# Patient Record
Sex: FEMALE | Race: WHITE | NOT HISPANIC OR LATINO | Employment: FULL TIME | URBAN - METROPOLITAN AREA
[De-identification: names, ages, dates, MRNs, and addresses within clinical notes are randomized per-mention and may not be internally consistent; named-entity substitution may affect disease eponyms.]

---

## 2023-11-30 ENCOUNTER — TELEPHONE (OUTPATIENT)
Dept: NEUROLOGY | Facility: CLINIC | Age: 43
End: 2023-11-30

## 2023-11-30 NOTE — TELEPHONE ENCOUNTER
Laura Peralta called.  She is thinks she has Autism. Her brother has similar symptoms and they mentioned he may be on the spectrum. She would like  a referral to an adult autism specialist. Do you know any providers that treat this?

## 2024-01-04 ENCOUNTER — APPOINTMENT (OUTPATIENT)
Dept: PRIMARY CARE | Facility: CLINIC | Age: 44
End: 2024-01-04
Payer: COMMERCIAL

## 2024-01-05 ENCOUNTER — TELEMEDICINE (OUTPATIENT)
Dept: PRIMARY CARE | Facility: CLINIC | Age: 44
End: 2024-01-05
Payer: COMMERCIAL

## 2024-01-05 DIAGNOSIS — U07.1 COVID: Primary | ICD-10-CM

## 2024-01-05 PROCEDURE — 99212 OFFICE O/P EST SF 10 MIN: CPT | Performed by: FAMILY MEDICINE

## 2024-01-05 RX ORDER — ALBUTEROL SULFATE 90 UG/1
2 AEROSOL, METERED RESPIRATORY (INHALATION) EVERY 4 HOURS PRN
Qty: 6.7 G | Refills: 0 | Status: SHIPPED | OUTPATIENT
Start: 2024-01-05 | End: 2025-01-04

## 2024-01-05 NOTE — PROGRESS NOTES
Chief Complaint: URI sxs  Not on any long term meds  HPI:  +COVID test at home  Monday very tired Tuesday woke up and felt great and then out of nowhere head felt weird and tingly and occiput felt swollen and it hurt and thought optical migraine--took Mg/Zn/ibuprofen/flonase--COVID test + yesterday  Weird numbness/tingling in low face and glands with HA    Fever--sl Tuesday with HA-none since  Chills--sl Tuesday with HA--still present  Aches--Sl Tuesday with LOGAN  Cough--started last pm a little bit --today- no  Exhaustion--yes  Sob or wheeze--no  Chest tightness--no  Sore throat--dry on Monday--last night it felt sore and swollen-today when drinking water does not feel comfortable  Congestion--yes today last night--green when blows nose  RN/stuffy nose/PND--yes last night  Headache--yes-- still present  Nausea--Tuesday with the HA  Vomiting--no  Diarrhea--no  Appetite--ok  Fluids--yes  Exposures--brother had a stroke recently from HTN-- 275/142--so in and out of hospitals with him    OTC meds--see above     VAXXED ? No--gland swelling issues so PCP did not recommend  Prior hx of COVID infection? No    ALL OTHER ROS (-)    General appearance:  Vitals available from patient?Yes  Alert, oriented, pleasant, in no apparent distress Yes  Answers questions appropriatelyYes  Eyes clear?Yes  Is patient in respiratory distressNo    Throat exam Not Available  Is patient coughing during consult:No  Audible wheezing noted? :No  Pt sounds congested?: Yes  Sniffing or rhinorrhea?: Yes  Tender neck LAD by pt exam?:Yes  Psychiatric: Affect normalYes  Other relevant physical exam:    Pt location in [place] and consent obtained. Telemedicine appropriate evaluation completed. Appropriate physical exam done.    COVID +  Paxlovid discussed--how to take and how it works and adverse effects--paxlovid prescribed? Yes  CDC isolation guidelines discussed---first day of symptoms is day #0--- days 0-day #5 ISOLATE at home alone or with other COVID  + individuals-- Days #6-10 if you go out, wear a mask, either an N95 or a KN95 as you are likely still contagious.  Avoid immunocompromised, elderly, pregnant women, infants etc  Albuterol MDI as needed (has used in past with URIs)  Low threshold for in person evaluation

## 2024-01-05 NOTE — PATIENT INSTRUCTIONS
Please send me a [employer] message if you have any questions or concerns.  FOR NON URGENT questions only.  Allow up to 72 hours for response.    If you have prescription issues or other questions you can email   Osmel Brown,  Digital Health Coordinator, at   [email or web address]     COVID +  Paxlovid discussed--how to take and how it works and adverse effects--paxlovid prescribed? Yes  CDC isolation guidelines discussed---first day of symptoms is day #0--- days 0-day #5 ISOLATE at home alone or with other COVID + individuals-- Days #6-10 if you go out, wear a mask, either an N95 or a KN95 as you are likely still contagious.  Avoid immunocompromised, elderly, pregnant women, infants etc  Albuterol MDI as needed (has used in past with URIs)  Low threshold for in person evaluation     Rest and drink plenty of fluids    Tylenol and or motrin as needed for pain and fever (unless you have been told not to take these because of your personal medical history)    Discussed options and precautions:   Viral versus bacterial infection; use of medications; possible side effects; appropriate over-the-counter medications; possible complications and /or when to follow-up.    Follow-up in 1 to 2 days if not improving.  Follow-up immediately if symptoms worsen.    All red flags requiring in person care were discussed.  All patient's questions were answered.    Limitations to telemedicine include inability to do a complete and accurate physical exam.  Any concerns regarding this were conveyed with the patient and in person follow-up recommended if patient nature of illness does not progress as anticipated during this visit.    Over-the-counter cold and cough medications    Take Mucinex for cough, drink plenty of fluids with this medication and will help break up congestion making it easier to cough up    For cough can use honey (children ages 1 and up) in hot tea or hot water. I recommend putting this in an insulated  cup and carrying it around throughout the day to sip on.  Have it at your bedside at night in case you wake up coughing.  You can also use honey cough drops (adults and older children).    Recommend nasal saline for use in children and adults.  Neti Hamilton can also be helpful.  (Never used tap water and a Neti pot.  Use distilled water.)    If you have plugged up congested ears or the feeling of fluid in your ears, you can use an over-the-counter nasal steroid spray like fluticasone (brand name Flonase) use 2 sprays into each nostril once or twice a day for 7 days.  This will help open up the eustachian tubes and allow the fluid to drain out of your ears.    Sleeping with your head/chest elevated can help with sinus drainage.    Adults only-can use nasal decongestant (like Afrin) at bedtime to open nasal passages so you can breathe through your nose while you sleep; avoid using for longer than 3 days as this medication can become addicting.  Do not use if you have high blood pressure or high heart rate.    For severe pain or fever in adult-Tylenol (2 extra strength) or ibuprofen (3-4 tabs equals 600 to 800 mg) alternating as needed for pain.  Tylenol doses should be 6 to 8 hours apart and ibuprofen doses should be 6 to 8 hours apart.      Common cold medications for adults explained:    **Cold medications for children are not recommended-only Tylenol, Motrin, honey (only for children older than 1 year), cool-mist humidifier, and nasal saline spray are recommended for children.    Mucinex-(generic name guaifenesin)-is an expectorant.  This will thin out all the thick mucus.  Must drink plenty of liquids for this medicine to work.    Dextromethorphan (brand name Delsym or DM)-this medicine is a cough suppressant    Honey in hot water or tea-this is a natural cough suppressant    Decongestant nasal spray- (eg: Afrin) use for temporary relief of nasal congestion-best when used at bedtime to open up nasal passages so that  you are not forced to mouth breathe overnight.    Sudafed (generic name pseudoephedrine-this must be bought from the pharmacist) DO NOT use this medicine if you have high blood pressure as it can raise your blood pressure higher.  Do not use if you have any irregular heart rate.  This medicine can help clear congestion in your sinuses.

## 2024-02-11 ENCOUNTER — TELEMEDICINE (OUTPATIENT)
Dept: PRIMARY CARE | Facility: CLINIC | Age: 44
End: 2024-02-11
Payer: COMMERCIAL

## 2024-02-11 DIAGNOSIS — B00.1 COLD SORE: Primary | ICD-10-CM

## 2024-02-11 PROCEDURE — 99213 OFFICE O/P EST LOW 20 MIN: CPT | Performed by: FAMILY MEDICINE

## 2024-02-11 RX ORDER — VALACYCLOVIR HYDROCHLORIDE 1 G/1
1000 TABLET, FILM COATED ORAL 2 TIMES DAILY
Qty: 4 TABLET | Refills: 0 | Status: SHIPPED | OUTPATIENT
Start: 2024-02-11 | End: 2024-02-13

## 2024-02-11 NOTE — PROGRESS NOTES
Subjective   Patient ID: Laura Peralta is a 43 y.o. female who presents for cold sore    HPI  Virtual Visit    An interactive audio and video telecommunication system which permits real time communications between the patient (at the originating site) and provider (at the distant site) was utilized to provide this telehealth service.   Verbal consent was requested and obtained from Laura Peralta on this date, 02/11/24 for a telehealth visit.     Has a cold sore on her lower lip, started this morning. Usually takes valtrex and improves it. Also discussed using OTC abreva    Review of Systems  As per HPI    Vitals:  due to telehealth visit, vitals not obtained, patient did not have them available at the time of the visit       Objective   Physical Exam  Physical exam done virtually through the computer screen     Gen: NAD  eyes: conjunctivae normal   Nose: external nose normal   Resp: does not appear to be short of breath at present time, no audible wheezing  Oropharynx: cold sore on lower lip    Assessment/Plan   Problem List Items Addressed This Visit    None  Visit Diagnoses       Cold sore    -  Primary    Relevant Medications    valACYclovir (Valtrex) 1 gram tablet

## 2024-02-11 NOTE — PATIENT INSTRUCTIONS
Please take your medications as prescribed  please call your PCP if your symptoms fail to improve or worsen